# Patient Record
Sex: FEMALE | Race: WHITE
[De-identification: names, ages, dates, MRNs, and addresses within clinical notes are randomized per-mention and may not be internally consistent; named-entity substitution may affect disease eponyms.]

---

## 2018-12-21 ENCOUNTER — HOSPITAL ENCOUNTER (OUTPATIENT)
Dept: HOSPITAL 89 - NUC | Age: 78
End: 2018-12-21
Attending: INTERNAL MEDICINE
Payer: MEDICARE

## 2018-12-21 DIAGNOSIS — R00.2: Primary | ICD-10-CM

## 2018-12-21 PROCEDURE — 93017 CV STRESS TEST TRACING ONLY: CPT

## 2018-12-21 PROCEDURE — 78452 HT MUSCLE IMAGE SPECT MULT: CPT

## 2018-12-21 NOTE — RADIOLOGY IMAGING REPORT
FACILITY: [facility] [place] 

 

PATIENT NAME: Erin Cardona

: 1940

MR: [id number]

V: [id number]

EXAM DATE: 

ORDERING PHYSICIAN: DARRYL ZACARIAS

TECHNOLOGIST: 

 

Location: [facility] - [place]

Patient: Erin Cardona

: 1940

MRN: [id number]

Visit/Account:[id number]

Date of Sevice: 2018

 

ACCESSION #: [id number]

 

EXERCISE MYOCARDIAL PERFUSION IMAGING

 

Single Isotope SPECT Imaging with Exercise and Gated SPECT Imaging

 

DATE OF EXAMINATION:  2018

 

REQUESTING PHYSICIAN:  DEANN

 

INDICATION:  Atrial fibrillation

 

PROCEDURE:  After informed consent the patient received an intravenous injection of  Tc-99m sestamibi
 followed at the appropriate time interval by rest imaging.  The patient then exercised according to 
the standard Ian protocol for 7.5 minutes achieving 9 METS.  Resting heart rate was 65 bpm with a p
eak heart rate of 136 bpm which  is 95 % of maximal predicted heart rate for age.  Blood pressure at 
rest was 169/102; blood pressure during exercise was 202/72.  There was no chest pain during exercise
.  Exercise was discontinued because of fatigue.  Baseline EKG demonstrates sinus rhythm.  There were
 no EKG changes of ischemia at peak exercise.  Approximately one minute and 30 seconds prior to the t
ermination of exercise, the patient received an intravenous injection of Tc-99m sestamibi followed by
 stress imaging.

 

DOSE of Tc-99m sestamibi:

REST: 12.0

STRESS: 29.8

 

RAW DATA:  Examination of the summed raw data revealed a excellent quality study.

 

MYOCARDIAL PERFUSION:  The tomographic images demonstrate normal myocardial perfusion study without e
vidence of myocardial ischemia or infarct

 

GATED IMAGES:  The gated images demonstrate normal LV systolic performance and wall motion with LVEF 
greater than 80%.

 

IMPRESSION:

1.  Adequate exercise tolerance without chest pain or ischemic EKG changes noted. Hypertension throug
hout the study noted no significant arrhythmias throughout the study: No atrial fibrillation.

2. Normal myocardial perfusion study without evidence of myocardial ischemia or infarct.

3. Normal LV systolic performance and wall motion with LVEF greater than 80%

 

Report Dictated By: Nando Batres at 2018 3:56 PM

 

Report E-Signed By: Nando Batres  at 2018 4:00 PM

 

WSN:[id number]

## 2018-12-21 NOTE — RT STRESS TEST REPORT
FACILITY: [facility] - [place] 

 

PATIENT NAME: RADHA STEWARD

: [id number]

MR: [id number]

V: [id number]

EXAM DATE: 

ORDERING PHYSICIAN: DARRYL ZACARIAS

TECHNOLOGIST: Selena

 

Acquisition Time: 2018  14:05:30

Total Exercise Time: 00:07:37

Test Indications: AFIB

Medications: SEE NUCLEAR MED SHEET

              

              

              

              

              

              

              

              

              

Protocol: LEV 2         

Max HR: 136 BPM  95% of  Pred: 143 BPM

Max BP: 202/072 mmHG

Max Work Load: 9.5 METS

 

 

Confirmed by LIONEL HIDALGO ([id number]) on 2018 9:09:03 PM

 

Referred By:             Overread By: LIONEL HIDALGO